# Patient Record
Sex: MALE | Race: WHITE
[De-identification: names, ages, dates, MRNs, and addresses within clinical notes are randomized per-mention and may not be internally consistent; named-entity substitution may affect disease eponyms.]

---

## 2018-01-14 ENCOUNTER — HOSPITAL ENCOUNTER (EMERGENCY)
Dept: HOSPITAL 95 - ER | Age: 68
Discharge: HOME | End: 2018-01-14
Payer: MEDICARE

## 2018-01-14 VITALS — WEIGHT: 240 LBS | HEIGHT: 70 IN | BODY MASS INDEX: 34.36 KG/M2

## 2018-01-14 DIAGNOSIS — Z90.5: ICD-10-CM

## 2018-01-14 DIAGNOSIS — Z79.899: ICD-10-CM

## 2018-01-14 DIAGNOSIS — R33.9: Primary | ICD-10-CM

## 2018-01-14 DIAGNOSIS — Z88.8: ICD-10-CM

## 2018-01-14 DIAGNOSIS — Z91.030: ICD-10-CM

## 2018-01-14 LAB
RBC #/AREA URNS HPF: (no result) /HPF (ref 0–2)
SP GR SPEC: 1 (ref 1–1.02)
UROBILINOGEN UR STRIP-MCNC: (no result) MG/DL
WBC #/AREA URNS HPF: (no result) /HPF (ref 0–5)

## 2018-01-17 ENCOUNTER — HOSPITAL ENCOUNTER (EMERGENCY)
Dept: HOSPITAL 95 - ER | Age: 68
Discharge: HOME | End: 2018-01-17
Payer: MEDICARE

## 2018-01-17 VITALS — WEIGHT: 265 LBS | HEIGHT: 64 IN | BODY MASS INDEX: 45.24 KG/M2

## 2018-01-17 DIAGNOSIS — N99.840: Primary | ICD-10-CM

## 2018-01-17 DIAGNOSIS — Z88.8: ICD-10-CM

## 2018-01-17 DIAGNOSIS — N99.820: ICD-10-CM

## 2018-01-17 DIAGNOSIS — Z91.030: ICD-10-CM

## 2018-01-17 DIAGNOSIS — Z79.899: ICD-10-CM

## 2021-04-25 NOTE — NUR
PT QUITE PLEASNT TALKATIVE. A/O SINCE ADMIT. IS CONSTANTLY TELLING JOKES.
STATES SOB WITH EXERTION. H/R REG, NO MURMERNOTED. IS DISTANT. LUNGS CLEAR, ON
R.A. WHEN SITTING, NO SOB NOTED. ABLE TO TALK IN FULL SENTENCES. PRESENTS
LIGHTLY ANX. BT X4 LAST BM YEST. VIODS PER BATHROOM. SBA. HAS HAD SOME
DIZZINESS, BUT NO FALLS AT HOME. BED IN LOW POSITION, CALL LITE IN REACH,
CALLS APPROP. REQUESTED HE TO HAVE SOMEONE WALK WITH HIM TO BATHROOM FOR
SAFETY

## 2021-04-26 NOTE — NUR
K WAS 3.1 THIS AM W/40MEQ KRIDER RX'D BY . HE WAS ALSO ALERTED TO
TROP TRENDING SLIGHTLY UPWARD (NOW 0.042) W/O S/S CARDIAC DISTRESS. NO NEW
ORDERS RECIEVED PERTAINING TO THAT.

## 2021-04-26 NOTE — NUR
CLIENT AA&OX4. DENIES SOB, GARZA IS RELIEVED BY REST. BLE EDEMA RESOLVED. LCTA
ALL FEILDS. DENIES PAIN. CLIENT EDUCATED ON HEART ZONES, DAILY WEIGHT, S/S OF
CHF TO REPORT TO MD, LOW SODIUM DIET AND NEW MEDICATIONS. FOLLOW UP APPT MADE
AND REVIEWED. PT HAS NO QUESTIONS OR CONCERNS AT THIS TIME. DISCHARGED TO HOME
WITH SPOUSE.

## 2021-06-12 NOTE — NUR
IV treatment is the strongly preferred regimen.    Fam Loaiza    SUMMARY: A/OX4, INDEPENDENT IN ROOM AND CALLS APPROPRIATELY FOR ASSIST. HE'S
DENIED PAIN AND ALL OTHER COMPLAINTS T/O NOCTE. PT IS NSR W/OCC PVC'S AT
60'S-70'S BPM. 1500ML FLUID RESTRICT IN PLACE AND HE'S DIURESING WELL SINCE
STARTING IV LASIX. BLE EDEMA IS 1+ AND IMPROVING. LS CLEAR ON RA AND PT DENIES
SOB OR DYSPNEA. TROPS TRENDED UP SLIGHTLY (NOW 0.042 WAS 0.038) BUT PT IS
ASYMPTOMATIC OF CARDIAC DISTRESS. VSS/AFEBRILE AND NO ACUTE CHANGES. WCTM AND
REPORT TO DAY RN.

## 2024-09-28 NOTE — NUR
ASSUMPTION OF CARE/SHIFT SUMMARY:
 PT ALERT AND ORIETNED, CALM AND COOPERATIVE TO CARE. HE HAS SCATTERED
BRUISING T/O. BRUISE ON OUTSIDE CORNER OF RIGHT EYE, PT REPORTS FROM SLIP OUT
OF BED, OTHERWISE SKIN INTACT. NO BREAKDOWN NOTED. NURSE ASSIST FOR IV
MANAGEMENT, DIZZY AND WEAK. HR IN 'S-130'S, AFIB, SBP IN THE 80'S-90'S,
HEPARIN GTT RUNNING PER ORDERS. LS DIM, COARSE AT BASES. +BS, NO TENDERNESS ON
PALPATION. BLE EDEMA +3, DEEP. PT REPORTS SOB WITH EXERTION. ANTICIPATING
CARDIOVERSION TOMORROW, PT WILL BE NPO AT MIDNIGHT FOR PROCEDURE. WILL
CONTINUE TO MONITOR AND REPORT TO NIGHT SHIFT RN.

## 2024-09-29 NOTE — NUR
MEET AND GREET WITH PT/FAMILY. PT'S EJECTION FRACTION AS BEEN TRENDING DOWN
OVER THE LAST SEVERAL YEARS.
ECHO TODAY 9/29/24 HAS EF 14%. ELEVATED BNP 2525. CALCIFIED AORTIC VALVE WITH
SEVERE STENOSIS.
ECHO 5/18/09 EF 65%
ECHO 6/13/11 EF 55-60%
ECHO 6/23/12 EF 42%
ECHO 4/6/15 EF 50%
 
PT DECLINED PALLIATIVE CARE SERVICES AT THIS TIME. HE IS WANTING TO PURSUE
VALVE REPLACEMENT. PT HAS BEEN ACCEPTED AT [facility], [place], PENDING
AVAILABLE BED.
 
PC WILL REMAIN AVAILABLE AS NEEDED.

## 2024-09-29 NOTE — NUR
DAY SHIFT SUMMARY
PT WAS CARDIOVERTED THIS AM BY DR. MCKEON W DR. JENKINS PROVIDING SEDATION.
FOLLOWING CARDIOVERSION THE PT REQUIRING BIPAP AND LOW DOSE LEVOPHED FOR
APPROX ONE HOUR POST CARDIOVERSION AND THEN WAS TITRATED OFF OF LEVOPHED AND
ONTO RM AIR. PT HAS REMAINED ALERT AND ORIENTED THIS SHIFT COMMUNICATING
APPROPRIATELY W STAFF. PT'S BP SOFT BUT REMAINED STABLE MAP >65 THIS SHIFT.
SPO2 >92% ON RM AIR. MONITOR SHOWING SINUS ARRYTHMIA 80'S-100 W PAC'S AND
PVC'S POST CARDIOVERSION. PT UP IN RECLINER FOR MOST OF THE DAY. PT W POOR PO
FOOD INTAKE REPORTING POOR APPETTITE. PT'S HEPARIN GTT RUNNING AT ORDERED RATE
ALL SHIFT. WILL REPORT TO ONCOMING RN. PT VOIDED THIS AM BUT DENIED NEED TO
VOID THIS AFTERNOON, BLADDER SCAN SHOWING 124ML. WILL REPORT TO ONCOMING RN.

## 2024-09-29 NOTE — NUR
ASSUME CARE:
PT A/Ox4 AND PLEASANT W/CARE. PT WIFE STATES THE PT WAS POSSIBLY HAVING SOME
HALLUCINATIONS AND SEEING DOGS OUTSIDE HIS ROOM, WHICH WERE NOT THERE. WIFE
ALSO STATES THAT HE HAS SOME INSTANCES OF THIS AT HOME. SBP 80s-90s, MAP>65,
DENIES CP AND PRESSURE AT THIS TIME. MONITOR SHOWS SINUS RYTHM W/ PVCs AND
PACS, RYTHM VERY IRREGULAR. HR 80s-90s. SPO2>90% ON RA, DENIES SOB. PT HAS
PRODUCTIVE, WET COUGH W/RUNNY NOSE. PT STATES HE WAS TESTED A COUPLE DAYS AGO
AT URGENT CARE FOR COVID WHICH WAS NEGATIVE. PT ABLE TO STAND AT BEDSIDE W/
2 PERSON ASSIST TO USE THE URINAL. PT SOMEWHAT WEAK. OUTPUT MINIMAL AND WINSTON
COLORED. HEPARIN GTT AT 17 U/KG/HR INFUSING IN RT AC. PT INSTRUCTED TO USE
CALL LIGHT FOR NEEDS. WILL UPDATE AS NEEDED.

## 2024-09-29 NOTE — NUR
SHIFT SUMMARY
 
NEURO: WNL
CARDIAC: AFIB 'S. HEPARIN GTT RUNNING. PO AMIO GIVEN. PULSES PRESENT.
LUNGS: INSPIRATORY WHEEZES. ON RA.
GI/: WNL-NPO SINCE MIDNIGHT
SKIN: BRUISE FROM FALL PRIOR TO ADMIT, RIGHT PERIORBITAL AREA
 
RN REQUESTED MAG LEVEL. NO ORDERS AT THIS TIME.

## 2024-09-29 NOTE — NUR
Transfer to ICU 10 for Cardioversion
 
Replaced dressing to right ac iv d/t it leaking.
Zoll Patches applied to chest.
Patient put on monitor in ICU. Pt AFIB rhythm.

## 2024-09-29 NOTE — NUR
0824 CARDIOVERSION
AUBREY ROSALES.. IN ROOM ALONG WITH ECHO TECH AND DR MCKEON
LIDOACAINE 4% GIVEN TO BACK OF THROAT.
BP Q 3 MIN.
ZOLL ON TO SYNC
BP TO Q 2 MIN.
PULSE 116 /88 MAP 93 RR 33 SPO2 100% ENTITLE CO2 AT 12
OXYGEN IS POM MASK AT 15L
0828 : EMMA GIVING SEDATIVE MEDICATION VIA IV
0829: DR MCKEON PLACING SCOPE FOR CHRISSIE WITH ECHO TECH
0830: PULSE 101 RR 20 /88 SPO2 98%
0831: BP 70/49 MAP 65 PULSE 108 RR 22 SPO2 96% SCOPE IN PLACE
100MG OF OLIVIER GIVEN IV BY AUBREY
0832: BP 68/58 MAP 64 SPO2 89% RR 30 PULSE 93 SCOPE IN PLACE
0833: EPHEDRINE IV BY AUBREY GIVEN SEE ANETHEISA NOTES
0836 BUBBLE AGGITATION INJECTED. PT COUGHING.
[id number] SPO2 89% /83 MAP 94 RR 25
0837 SPO2 91% PULSE 118 ENTITLE 12 SCOPE OUT
OK TO DO CARDIOVERSION BY DR MCKEON
SYNC AT 200J BY DR MCKEON
0838 CHARGE TO SHOCK... SPO2 73% RHYTHM 110 TO 77
BP 70/58 MAP 64
0839 OLIVIER-SYNEPHERINE BY AUBREY GIVEN
0841 GIVE AMIODARONE 150MG  WITH FLUSH PULSE 88 AFIB  RR 24 BP 89/64 MAP 73
ENTITLE 15
SPO2 90%.
0844 AMIODARONE 150MG GIVEN AGAIN RT BAGGING PT SPO2 87% RR 35 PULSE 82
IV FLUID GOING WIDE OPEN .
0846 OPA TAKEN OUT AND PT SNOROUS, OPA BACK IN POM MASK ON PT NOW. ANETHISIA
VERBAL AROUSAL PT GRIMMACES CURRENTLY.
0848: SPO2 77% RT BAGGING PT PULSE 77 BP 64/51 MAP 58
0851 POM MASK ON RR 32 SPO2 72% PULSE 79 BP 88/55 MAP 65
PT COUGHING
0852 BIPAP AT 14/8 SPO2 100 PLACED ON PT BY RT.
0854: BP 65/46 MAP 54 PULSE 83  RR 25 BIPAP (100% 14/10) SPO2 81%
0855 ORDER FOR LEVOPHED BY AUBREY BRIDGES GIVEN OLIVIER-SYNEPHERINE
0859
EPHEDRINE IV
PULSE 88 RR 30 BIPAP ON SPO2 94% SETTINGS BIPAP 12/10 BACK UP 14 100%
0901:  BP 78/67 MAP 73 PULSE 85 RR 26 94% SPO2. PT TALKING WITH NURSE WITH
BIPAP ON.
0903 BP 93/81 MAP 87 PULSE 85 SPO2 98% RR 30 ON BIPAP
0904: ANETHESIA END TIME
 
 
 
MAP 73

## 2024-09-30 NOTE — NUR
SHIFT SUMMARY:
PT A/Ox4 AND COOPERATIVE W/CARE T/O THE NIGHT. PT HAD SOME HALLUCINATIONS
WHERE HE ASKED ABOUT THE CATS OUTSIDE, AND ALSO SOME CONFUSION THINKING HE WAS
LEAVING RIGHT AWAY TO TRANSFER TO [place], WANTING TO PUT ON HIS PANTS AND
GET READY. PT REORIENTED. SBP 80-90s, MAP>65. HR 80s-100s. MONITOR SHOWS SINUS
ARRYTHMIA W/ PVCs AND PACs. PT DENIES CP OR PRESSURE. PT DESAT 86-88s
W/SLEEP, 2L NC APPLIED SPO2 >90%. HEPARIN GTT INFUSING AT 17 U/KG/HR IN RT AC.
PT ABLE TO STAND AT BEDSIDE W/ MINIMAL ASSISTANCE TO USE URINAL. URINE WINSTON
IN COLOR, 200 OUTPUT T/O THE NIGHT. CALL LIGHT IN REACH AND BED ALARM ON. WILL
REPORT TO ONCOMING RN.

## 2024-09-30 NOTE — NUR
ASSUMED CARE.
PT SITTING UPRIGHT IN BED, A&0X4. HE IS ALERT AND ABLE TO ANSWER QUESTIONS
APPROPRIATELY. PT IS ON RA WITH SPO2 AT >96%. SYSTOLIC BP IN 80-90S. MAP >65.
RESTING HR BETWEEN 90-100S. CONTINUOUS CARDIAC MONITORING IN PLACE. PT IS
CONTINENT OF URINE WITH URINAL AT BEDSIDE. HEPARIN INFUSING AT 17UNITS/KG/HR.
BED IN LOWEST POSITION, CALL LIGHT IN REACH.
**1000AM TRANSFER OF CARE**
GROUND TRANSPORT ASSUMED CARE OF PATIENT FOR TRANSFER TO [facility].
DOBUTAMINE 2.5MCG/KG/MIN AND HEPARIN 17UNIT/KG/HR. HE IS A&0x4. GAVE REPORT TO
BENNETT [facility] AT [facility].